# Patient Record
Sex: MALE | Race: WHITE
[De-identification: names, ages, dates, MRNs, and addresses within clinical notes are randomized per-mention and may not be internally consistent; named-entity substitution may affect disease eponyms.]

---

## 2017-05-30 ENCOUNTER — HOSPITAL ENCOUNTER (EMERGENCY)
Dept: HOSPITAL 44 - ED | Age: 22
Discharge: HOME | End: 2017-05-30
Payer: COMMERCIAL

## 2017-05-30 VITALS — DIASTOLIC BLOOD PRESSURE: 62 MMHG | SYSTOLIC BLOOD PRESSURE: 112 MMHG

## 2017-05-30 DIAGNOSIS — S39.012A: Primary | ICD-10-CM
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
## 2017-05-30 LAB
PH UR STRIP: 6 [PH] (ref 5–8)
UROBILINOGEN URINE: 0.2 EU (ref 0.2–1)

## 2017-05-30 PROCEDURE — 96372 THER/PROPH/DIAG INJ SC/IM: CPT

## 2017-05-30 PROCEDURE — 99283 EMERGENCY DEPT VISIT LOW MDM: CPT

## 2017-05-30 PROCEDURE — 72100 X-RAY EXAM L-S SPINE 2/3 VWS: CPT

## 2017-05-30 PROCEDURE — 81002 URINALYSIS NONAUTO W/O SCOPE: CPT

## 2017-05-30 RX ADMIN — KETOROLAC TROMETHAMINE ONE MG: 30 INJECTION, SOLUTION INTRAMUSCULAR at 13:19

## 2017-05-30 NOTE — ED PHYSICIAN DOCUMENTATION
Low Back Pain





- HISTORIAN


Historian: patient





- HPI


Chief Complaint: Low Back Pain/ Injury


Additional Information: 


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 

approximately 10 ft injuring his left lower back- pt denies hitting head or neck

- no LOC- 


History: other (has had a pinched nerve in the past to the back)


Onset: hours (11:30 today)


Duration: continues in ED


Recent Injury: Yes (fall 10 ft)


Context: fall


Where: work


Other Injuries: back


Severity: moderate


Quality: burning, dull (increases with palpation), other (sore with movement)


Associated Symptoms: denies: fever, chills, sweating, incontinence, problems 

urinating


Worsened By:: other (movement and palpation)


Relieved By: remaining still


Further Comments: no





- ROS


CONST: no problems


CVS/RESP: none


EYES/ENT: none


MS/SKIN/LYMPH: back pain


Neuro/Psych: none


GI/: denies: abdominal pain, black stools, other





- PAST HX


Past History: sciatica


Other History: denies: aortic aneurysm, cancer chemo, cancer radiation, cardiac 

disease, CAD, arrhythmias, angina, CHF, CVA, diabetes Type 1, diabetes Type 2, 

hepatitis, hypertension, peptic ulcer disease, prostatitis, TIA, HIV, other


Surgeries/Procedures: none


Immunizations: UTD


Allergies/Adverse Reactions: 


 Allergies











Allergy/AdvReac Type Severity Reaction Status Date / Time


 


No Known Allergies Allergy   Verified 17 13:01














Home Medications: 


 Ambulatory Orders











 Medication  Instructions  Recorded


 


Cyclobenzaprine HCl [Flexeril] 5 mg PO TID PRN #30 tablet 17


 


Naproxen [Naprosyn] 500 mg PO BID #30 tablet 17














- SOCIAL HX


Smoking History: non-smoker


Alcohol Use: none


Drug Use: none





- FAMILY HX


Family History: none





- VITAL SIGNS


Vital Signs: 


 Vital Signs











Temp Pulse Resp BP Pulse Ox


 


    66   16   112/62   98 


 


    17 14:28  17 14:28  17 14:28  17 14:28














- REVIEWED ASSESSMENTS


Nursing Assessment  Reviewed: Yes


Vitals Reviewed: Yes





Progress





- Progress


Progress: 





pt resting comfortably- states pain is better after Toradol injection





ED Results Lab/Radiology





- Lab Results


Lab Results: 


 Lab Results











  17





  13:05


 


Urine Color  Yellow 





   (YELLOW) 


 


Urine Appearance  Clear 





   (CLEAR) 


 


Urine pH  6.0 





   (5.0 - 8.0) 


 


Ur Specific Gravity  1.020 





   (1.010-1.030) 


 


Urine Protein  Negative mg/dL mg/dL





   (NEGATIVE) 


 


Urine Ketones  Negative mg/dL mg/dL





   (NEGATIVE) 


 


Urine Occult Blood  Negative 





   (NEGATIVE) 


 


Urine Nitrite  Negative 





   (NEGATIVE) 


 


Urine Bilirubin  Negative 





   (NEGATIVE) 


 


Urine Urobilinogen  0.2 Eu Eu





   (0.2-1.0) 


 


Ur Leukocyte Esterase  Negative 





   (NEGATIVE) 


 


Urine Glucose  Negative mg/dL mg/dL





   (NEGATIVE) 














- Radiology


Radiology Impressions: 


Report Submission Date: May 30, 2017 2:04:49 PM CDT


Patient       Study


Name:   ARIANNA MUNOZ       Date:   May 30, 2017 1:02:05 PM CDT


MRN:   [id number]       Modality Type:   CR


Gender:   M       Description:   SPINE


:   97       Institution:   [facility]


Physician:   STEVEN SHEA


         


 


 


Lumbar spine three views 











Clinical history pain 











Technique AP lateral cone-down 





FINDINGS:   


There are 5 lumbar vertebra.  No compression fracture or lytic change is seen.  

There is disc space narrowing at L5-S1.  Minimal dextroscoliosis is present.  

There is no congenital anomaly.   





IMPRESSION:   


L5-S1 disc space narrowing 











Mild dextroscoliosis 











No acute lumbar spine pathology


 


Electronically signed on May 30, 2017 2:04:49 PM CDT by:


Shivam Reyes





- Orders


Orders: 


 ED Orders











 Category Date Time Status


 


 L SPINE 2 OR 3 VIEWS [RAD] Stat Exams  17 Completed


 


 URINALYSIS Routine Lab  17 13:05 Completed


 


 Ketorolac Tromethamine [Toradol] Med  17 13:04 Discontinued





 60 mg IM NOW ONE   














Low Back Pain/Injury





- Physical Exam


General Appearance: alert, mild distress


EENT: eye inspection normal, other (no tenderness to palpation)


Neck: non-tender, painless ROM


Resp/CVS: chest non-tender, breath sounds nml, heart sounds nml, no resp. 

distress, lungs clear


Abdomen: non-tender


Back: muscle spasm, other (pain with palpation over right perilumbar/flank area

, no bony abnl noted)


Neuro/Psych: oriented x3, motor nml, sensation nml, reflexes nml, mood/affect 

nml


Skin: warm/dry, normal color


Extremities: non-tender, normal range of motion, no evidence of injury





Discharge


Clincal Impression: 


 Lumbar back sprain





Prescriptions: 


Cyclobenzaprine HCl [Flexeril] 5 mg PO TID PRN #30 tablet


 PRN Reason: muscle spasms


Naproxen [Naprosyn] 500 mg PO BID #30 tablet


Referrals: 


Primary Doctor,No [Primary Care Provider] - 2 Days


Additional Instructions: 


Use cool compresses


May take Naproxen twice a day


Take Flexeril 5mg by mouth 3 times a day as needed for muscle spasms


Follow up with primary care provider if no improvement


Home Medications: 


Ambulatory Orders





Cyclobenzaprine HCl [Flexeril] 5 mg PO TID PRN #30 tablet 17 


Naproxen [Naprosyn] 500 mg PO BID #30 tablet 17 








Condition: Good


Disposition: 01 HOME, SELF-CARE


Decision to Admit: NO


Decision Time: 14:18

## 2017-05-30 NOTE — DIAGNOSTIC IMAGING REPORT
[facility]

[address] [facility] [address] [address]

 

 

 

 

Report Submission Date: May 30, 2017 2:04:49 PM CDT

Patient       Study

Name:   ARIANNA MUNOZ       Date:   May 30, 2017 1:02:05 PM CDT

MRN:   [id number]       Modality Type:   CR

Gender:   M       Description:   SPINE

:   97       Institution:   [facility]

Physician:   STEVEN SHEA

         

 

 

Lumbar spine three views 

Clinical history pain 



Technique AP lateral cone-down 



FINDINGS:   

There are 5 lumbar vertebra.  No compression fracture or lytic change is seen.  
There is disc space narrowing at L5-S1.  Minimal dextroscoliosis is present.  
There is no congenital anomaly.   



IMPRESSION:   

L5-S1 disc space narrowing 

Mild dextroscoliosis 

No acute lumbar spine pathology

 

Electronically signed on May 30, 2017 2:04:49 PM CDT by:

Shivam ESCOBAR

## 2017-08-03 ENCOUNTER — HOSPITAL ENCOUNTER (EMERGENCY)
Dept: HOSPITAL 44 - ED | Age: 22
LOS: 1 days | Discharge: HOME | End: 2017-08-04
Payer: SELF-PAY
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
## 2017-08-03 DIAGNOSIS — S00.83XA: Primary | ICD-10-CM
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
## 2017-08-03 PROCEDURE — 70450 CT HEAD/BRAIN W/O DYE: CPT

## 2017-08-03 PROCEDURE — 81002 URINALYSIS NONAUTO W/O SCOPE: CPT

## 2017-08-03 PROCEDURE — 72125 CT NECK SPINE W/O DYE: CPT

## 2017-08-03 PROCEDURE — S1016 NON-PVC INTRAVENOUS ADMINIST: HCPCS

## 2017-08-03 PROCEDURE — 80320 DRUG SCREEN QUANTALCOHOLS: CPT

## 2017-08-03 PROCEDURE — G0481 DRUG TEST DEF 8-14 CLASSES: HCPCS

## 2017-08-03 PROCEDURE — 80377 DRUG/SUBSTANCE NOS 7/MORE: CPT

## 2017-08-03 PROCEDURE — L0120 CERV FLEX N/ADJ FOAM PRE OTS: HCPCS

## 2017-08-03 PROCEDURE — G0480 DRUG TEST DEF 1-7 CLASSES: HCPCS

## 2017-08-03 PROCEDURE — 85025 COMPLETE CBC W/AUTO DIFF WBC: CPT

## 2017-08-03 PROCEDURE — 80053 COMPREHEN METABOLIC PANEL: CPT

## 2017-08-03 PROCEDURE — 99283 EMERGENCY DEPT VISIT LOW MDM: CPT

## 2017-08-04 VITALS — DIASTOLIC BLOOD PRESSURE: 75 MMHG | SYSTOLIC BLOOD PRESSURE: 111 MMHG

## 2017-08-04 LAB
AMPHETAMINE: NEGATIVE NG/ML (ref ?–1000)
APPEARANCE UR: CLEAR
BARBITURATES: NEGATIVE NG/ML (ref ?–300)
BASOPHILS NFR BLD: 0.7 % (ref 0–1.5)
CANNABINOIDS CTO UR-MCNC: NEGATIVE NG/ML (ref ?–50)
COCAINE: NEGATIVE NG/ML (ref ?–150)
COLOR,URINE: YELLOW
EGFR (AFRICAN): > 60
EGFR (NON-AFRICAN): > 60
EOSINOPHIL NFR BLD: 2 % (ref 0–6.8)
MCH RBC QN AUTO: 28.5 PG (ref 28–34)
MCV RBC AUTO: 87.6 FL (ref 80–100)
METHAMPHETAMINE: NEGATIVE NG/ML (ref ?–1000)
METHYLENEDIOXYMETHAMPHETAMINE: NEGATIVE NG/ML (ref ?–500)
MONOCYTES %: 4.7 % (ref 0–11)
NEUTROPHILS #: 4.2 # K/UL (ref 1.4–7.7)
OPIATES: NEGATIVE NG/ML (ref ?–300)
PH UR STRIP: 6 [PH] (ref 5–8)
RBC UR QL: NEGATIVE
UROBILINOGEN URINE: 0.2 EU (ref 0.2–1)
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
## 2018-05-29 ENCOUNTER — HOSPITAL ENCOUNTER (EMERGENCY)
Dept: HOSPITAL 44 - ED | Age: 23
Discharge: HOME | End: 2018-05-29
Payer: SELF-PAY

## 2018-05-29 VITALS — DIASTOLIC BLOOD PRESSURE: 51 MMHG | SYSTOLIC BLOOD PRESSURE: 118 MMHG

## 2018-05-29 DIAGNOSIS — M54.5: Primary | ICD-10-CM

## 2018-05-29 DIAGNOSIS — B35.4: ICD-10-CM

## 2018-05-29 PROCEDURE — 96372 THER/PROPH/DIAG INJ SC/IM: CPT

## 2018-05-29 PROCEDURE — 72110 X-RAY EXAM L-2 SPINE 4/>VWS: CPT

## 2018-05-29 PROCEDURE — 99284 EMERGENCY DEPT VISIT MOD MDM: CPT

## 2018-05-29 NOTE — DIAGNOSTIC IMAGING REPORT
[facility]

[address] [facility].[address] [address]

 

 

 

 

Report Submission Date: May 29, 2018 12:36:28 PM CDT

Patient       Study

Name:   ARIANNA MUNOZ       Date:   May 29, 2018 12:00:45 PM CDT

MRN:   [id number]       Modality Type:   DX

Gender:   M       Description:   SPINE

:   97       Institution:   [facility]

Physician:   STEVEN SHEA

     Accession:    [id number]

 

 

Examination: Plain film lumbar spine 



History: PT STATES LOW BACK PAIN X2 YEARS (Hx) 



Findings: 3 views of the lumbar spine demonstrate normal height. Minimal L5-S1 
disc space narrowing.   No anterior compression. No soft tissue abnormalities. 



Impression: No acute osseous process. No compression deformity. If patient is 
experiencing neurologic symptoms, consider obtaining MRI to further evaluate.

 

Electronically signed on May 29, 2018 12:36:28 PM CDT by:

Cayden ESCOBAR

## 2018-05-29 NOTE — ED PHYSICIAN DOCUMENTATION
General Adult





- HISTORIAN


Historian: patient





- HPI


Stated Complaint: Back pain


Chief Complaint: Low Back Pain/ Injury


Additional Information: 





21yo white male who has been having some intermittent episodes for about 4 

years. related to an old football injury. Started to have problems again about 

2 days ago. No precipitating factor noted. 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 

boxes and working machinery. Walking or bending over makes the pain worse. 

Discribed as a sharp jabbing type pain. Layingon the left side seems to help 

some.


Has had some ring worm type rash on thigh and chest area. Has used OTC product 

which did not seem to help. Has been present for about 2 months. 


Timing: still present





- ROS


CONST: no problems.  denies: fever, chills





- PAST HX


Past History: none


Other History: none


Surgeries/Procedures: none


Immunizations: UTD


Allergies/Adverse Reactions: 


 Allergies











Allergy/AdvReac Type Severity Reaction Status Date / Time


 


No Known Allergies Allergy   Verified 05/29/18 11:41














Home Medications: 


 Ambulatory Orders











 Medication  Instructions  Recorded


 


NK [NK]  08/04/17














- SOCIAL HX


Smoking History: non-smoker


Alcohol Use: none


Drug Use: none





- FAMILY HX


Family History: No





- VITAL SIGNS


Vital Signs: 





 Vital Signs











Temp Pulse Resp BP Pulse Ox


 


 97.9 F   69   16   118/51   99 


 


 05/29/18 11:30  05/29/18 11:30  05/29/18 11:30  05/29/18 11:30  05/29/18 11:30














- REVIEWED ASSESSMENTS


Nursing Assessment  Reviewed: Yes


Vitals Reviewed: Yes





ED Results Lab/Radiology





- Radiology


Radiology Impressions: 


Examination: Plain film lumbar spine 





History: PT STATES LOW BACK PAIN X2 YEARS (Hx) 





Findings: 3 views of the lumbar spine demonstrate normal height. Minimal L5-S1 

disc space narrowing.   No anterior compression. No soft tissue abnormalities. 





Impression: No acute osseous process. No compression deformity. If patient is 

experiencing neurologic symptoms, consider obtaining MRI to further evaluate.








General Adult Physical Exam





- PHYSICAL EXAM


GENERAL APPEARANCE: moderate distress


NECK: normal inspection


RESPIRATORY: no resp distress, chest non-tender, breath sounds normal.  No: 

wheezes, rales, rhonchi


CVS: reg rate & rhythm, heart sounds normal, equal pulses, no murmur


BACK: no CVA tenderness, other (mild tender to palpation over the lower lumbar/

sacral are on the right, no bony abnl noted. Patient does have some pain with 

ROM of twisting torso. R hip FROM.)


SKIN: warm/dry, other (two patches over the right ant thigh about 1 & 1.4 cm, 

ant chest near axilla 1cm, all are annular riased red borders with some central 

clearing. )


EXTREMITIES: non-tender, normal range of motion


NEURO: oriented X3, mood/affect nml, cognition normal





Discharge


Clincal Impression: 


 Back pain at L4-L5 level, Tinea corporis





Referrals: 


Primary Doctor,No [Primary Care Provider] - 2 Days


Additional Instructions: 


Advised to do exercises. Take Aleve 220mg tablets, take two tablets with food 

twice a day as needed for pain. Try some Lamisil cream to the skin lesions 

twice a day until cleared for 1 week. 


Condition: Stable


Disposition: 01 HOME, SELF-CARE


Decision to Admit: NO


Date of Decison to Admit: 05/29/18


Decision Time: 12:03

## 2018-06-19 ENCOUNTER — HOSPITAL ENCOUNTER (EMERGENCY)
Dept: HOSPITAL 44 - ED | Age: 23
Discharge: HOME | End: 2018-06-19
Payer: SELF-PAY

## 2018-06-19 VITALS — SYSTOLIC BLOOD PRESSURE: 119 MMHG | DIASTOLIC BLOOD PRESSURE: 68 MMHG

## 2018-06-19 DIAGNOSIS — R21: Primary | ICD-10-CM

## 2018-06-19 PROCEDURE — 99283 EMERGENCY DEPT VISIT LOW MDM: CPT

## 2018-06-19 RX ADMIN — CEPHALEXIN ONE: 250 CAPSULE ORAL at 20:21

## 2018-06-19 RX ADMIN — CEPHALEXIN ONE MG: 250 CAPSULE ORAL at 20:21

## 2018-06-19 RX ADMIN — ACYCLOVIR SCH: 200 CAPSULE ORAL at 20:28

## 2018-06-19 NOTE — ED PHYSICIAN DOCUMENTATION
Skin Rash





- HPI


Stated Complaint: Rash


Chief Complaint: General Adult


Onset: days ago (1)


Timing: still present


Location: facial


Quality: itchy


Identified Cause?: No


Where: work


Further Comments: yes (Pt is a 23 yo male with lesions below his L eye.  Pt 

first noticed this yesterday, but condition appears to be spreading.  It is 

painful and throbbing when pt is in a hot environment, but otherwise is not 

painful.  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  No systemic sx.  No previous hx of this problem.)





- ROS


CONST: none


CVS/RESP: none


EYES/ENT: other (lesions below L eye)


GI/: none


MS/SKIN/LYMPH: rash (lesions below L eye)


NEURO/PSYCH: none





- PAST HX


Past History: none


Other History: none


Allergies/Adverse Reactions: 


 Allergies











Allergy/AdvReac Type Severity Reaction Status Date / Time


 


No Known Allergies Allergy   Verified 06/19/18 20:05














Home Medications: 


 Ambulatory Orders











 Medication  Instructions  Recorded


 


NK [NK]  08/04/17














- SOCIAL HX


Smoking History: non-smoker





- FAMILY HX


Family History: none





- VITAL SIGNS


Vital Signs: 


 Vital Signs











Temp Pulse Resp BP Pulse Ox


 


 98.2 F   60   16   119/68   100 


 


 06/19/18 20:41  06/19/18 20:41  06/19/18 20:41  06/19/18 20:41  06/19/18 20:41














- REVIEWED ASSESSMENTS


Nursing Assessment  Reviewed: Yes


Vitals Reviewed: Yes





Progress





- Progress


Progress: 








Rx Acyclovir 400 mg po tid x 10 days, 2 refills


Rx Keflex 500 mg po tid x 10 days, 1st dose in ER.





ED Results Lab/Radiology





- Orders


Orders: 


 ED Orders











 Category Date Time Status


 


 Acyclovir Med  06/19/18 21:00 Ordered





 400 mg PO QID   


 


 Cephalexin [Keflex] Med  06/19/18 20:14 Discontinued





 500 mg .ROUTE .STK-MED ONE   


 


 Cephalexin [Keflex] Med  06/19/18 20:13 Discontinued





 500 mg PO NOW ONE   














Skin Rash Physical Exam





- EXAM


General Appearance: no acute distress


Skin: other (linear cluster of vesicular lesions below L eye, ? herpes zoster)


Location: face


Extremities: non-tender, nml ROM


Neck: trachea midline


Respiratory: no resp distress, chest non-tender, breath sounds normal


CVS: reg. rate & rhythm, heart sounds nml


Neuro/Psych: oriented x3





Discharge


Clincal Impression: 


 Rash, possible herpes zoster





Referrals: 


Connor Lerma MD [Primary Care Provider] - 


Condition: Good


Disposition: 01 HOME, SELF-CARE


Decision to Admit: NO


Decision Time: 20:40

## 2019-10-16 ENCOUNTER — HOSPITAL ENCOUNTER (EMERGENCY)
Dept: HOSPITAL 44 - ED | Age: 24
Discharge: HOME | End: 2019-10-16
Payer: SELF-PAY

## 2019-10-16 VITALS — DIASTOLIC BLOOD PRESSURE: 50 MMHG | SYSTOLIC BLOOD PRESSURE: 129 MMHG

## 2019-10-16 DIAGNOSIS — R30.0: Primary | ICD-10-CM

## 2019-10-16 PROCEDURE — 81002 URINALYSIS NONAUTO W/O SCOPE: CPT

## 2019-10-16 PROCEDURE — 99284 EMERGENCY DEPT VISIT MOD MDM: CPT

## 2019-10-16 PROCEDURE — 99283 EMERGENCY DEPT VISIT LOW MDM: CPT

## 2019-10-16 NOTE — ED PHYSICIAN DOCUMENTATION
Male Genitourinary Problems





- HISTORIAN


Historian: patient





- HPI


Stated Complaint: Painful Urination


Chief Complaint: Male Genitourinary Problems


Onset: days ago (5)


Duration: continues in ED


Severity: mild


Further Comments: yes (he states he has had burning with urination for a week 

and he was tested for STI and this was negative. He continues to have burning 

and possibly frequency. No low back pain. NO fever. No new sexual encounters. NO

penial discharge or pain)





- Associated Symptoms


Problems Urinating: discomfort w/ urination, pain w/ urination


Testicular Pain: none


Testicular Swelling: none





- Sexual History


Sexual History: unprotected intercourse





- ROS


CONST: none





- PAST HX


Past History: denies: other


Allergies/Adverse Reactions: 


                                    Allergies











Allergy/AdvReac Type Severity Reaction Status Date / Time


 


No Known Allergies Allergy   Verified 10/16/19 16:08














Home Medications: 


                                Ambulatory Orders











 Medication  Instructions  Recorded


 


NK  08/04/17














- SOCIAL HX


Smoking History: non-smoker


Alcohol Use: none


Drug Use: none





- FAMILY HX


Family History: none





- VITAL SIGNS


Vital Signs: 





                                   Vital Signs











Temp Pulse Resp BP Pulse Ox


 


 98.3 F   72   15   129/50   98 


 


 10/16/19 15:55  10/16/19 15:55  10/16/19 15:55  10/16/19 15:55  10/16/19 15:55














- REVIEWED ASSESSMENTS


Nursing Assessment  Reviewed: Yes


Vitals Reviewed: Yes





Male Genitourinary Problems





- EXAM


General Appearance: no acute distress, alert


Abdomen: non-tender, abnml bowel sounds


EENT: eye inspection normal, no signs of dehydration


Neck: nml inspection


Respiratory: no resp distress, chest non-tender, breath sounds normal


CVS: reg rate & rhythm, heart sounds normal


Back: non-tender.  No: CVA tenderness


Extremities: normal range of motion


Neuro/Psych: oriented X3





Discharge


Clincal Impression: 


 Dysuria





Referrals: 


Andriy Painter MD [Primary Care Provider] - 2 Days


Comments: 





1. Pyrium 100 mg take 1 by mouth three times per day x 3 days


2. Increase fluids


3. Follow up with PCP for possible Urology referral 


4. Return to ER for any increased concerns 


Condition: Stable


Disposition: 01 HOME, SELF-CARE


Decision to Admit: NO


Date of Decison to Admit: 10/16/19


Decision Time: 16:18 [place]

## 2019-10-17 LAB
APPEARANCE UR: CLEAR
COLOR,URINE: YELLOW
PH UR STRIP: 6 [PH] (ref 5–8)
RBC UR QL: (no result)
UROBILINOGEN URINE: 0.2 EU (ref 0.2–1)